# Patient Record
Sex: FEMALE
[De-identification: names, ages, dates, MRNs, and addresses within clinical notes are randomized per-mention and may not be internally consistent; named-entity substitution may affect disease eponyms.]

---

## 2022-09-02 ENCOUNTER — APPOINTMENT (OUTPATIENT)
Dept: NEUROSURGERY | Facility: CLINIC | Age: 32
End: 2022-09-02

## 2022-09-02 VITALS — HEIGHT: 61 IN | BODY MASS INDEX: 30.21 KG/M2 | WEIGHT: 160 LBS

## 2022-09-02 DIAGNOSIS — Z83.438 FAMILY HISTORY OF OTHER DISORDER OF LIPOPROTEIN METABOLISM AND OTHER LIPIDEMIA: ICD-10-CM

## 2022-09-02 DIAGNOSIS — Z82.49 FAMILY HISTORY OF ISCHEMIC HEART DISEASE AND OTHER DISEASES OF THE CIRCULATORY SYSTEM: ICD-10-CM

## 2022-09-02 DIAGNOSIS — Z87.898 PERSONAL HISTORY OF OTHER SPECIFIED CONDITIONS: ICD-10-CM

## 2022-09-02 DIAGNOSIS — Z83.3 FAMILY HISTORY OF DIABETES MELLITUS: ICD-10-CM

## 2022-09-02 DIAGNOSIS — Z78.9 OTHER SPECIFIED HEALTH STATUS: ICD-10-CM

## 2022-09-02 PROBLEM — Z00.00 ENCOUNTER FOR PREVENTIVE HEALTH EXAMINATION: Status: ACTIVE | Noted: 2022-09-02

## 2022-09-02 PROCEDURE — 99441: CPT

## 2022-09-02 RX ORDER — ACETAMINOPHEN 325 MG/1
TABLET, FILM COATED ORAL
Refills: 0 | Status: ACTIVE | COMMUNITY

## 2022-09-02 NOTE — HISTORY OF PRESENT ILLNESS
[Home] : at home, [unfilled] , at the time of the visit. [Medical Office: ([facility])___] : at the medical office located in  [Verbal consent obtained from patient] : the patient, [unfilled] [FreeTextEntry1] : "AVM" [de-identified] : Pt is a pleasant 32 year old right handed lady who reports that on 6/28/22 she went to [facility] due to dizziness, nausea, "black flashes" and tingling/numbness in left upper extremity extending to left thumb.  Pt states that episode lasted about 30 minutes.  Neuro imaging revealed an incidental finding of a cerebral AVM.\par \par States that she was transferred to [facility] for surgical intervention however after further diagnostic imaging she states that surgery was deferred because the surgeon felt that the AVM was too close to ivitalt brain structures.  Recommendation made for GK radiosurgery however patient expressed that she would prefer to have surgery due to her young age.

## 2022-09-02 NOTE — ASSESSMENT
[FreeTextEntry1] : IMPRESSION:\par 1. Recently diagnosed cerebral AVM.\par \par PLAN:\par 1. Functional brain MRI for speech and motor function.

## 2022-09-02 NOTE — PHYSICAL EXAM
[Person] : oriented to person [Place] : oriented to place [Time] : oriented to time [Motor Handedness Right-Handed] : the patient is right hand dominant

## 2022-09-30 ENCOUNTER — APPOINTMENT (OUTPATIENT)
Dept: NEUROSURGERY | Facility: CLINIC | Age: 32
End: 2022-09-30

## 2022-09-30 DIAGNOSIS — Z78.9 OTHER SPECIFIED HEALTH STATUS: ICD-10-CM

## 2022-09-30 PROCEDURE — 99443: CPT

## 2022-10-03 PROBLEM — Z78.9 NON-SMOKER: Status: ACTIVE | Noted: 2022-09-02

## 2022-10-03 NOTE — PHYSICAL EXAM
[General Appearance - Alert] : alert [General Appearance - Well Nourished] : well nourished [General Appearance - Well-Appearing] : healthy appearing [Oriented To Time, Place, And Person] : oriented to person, place, and time [Affect] : the affect was normal [Memory Recent] : recent memory was not impaired [Person] : oriented to person [Place] : oriented to place [Time] : oriented to time

## 2022-10-05 RX ORDER — ONDANSETRON 4 MG/1
4 TABLET ORAL
Qty: 1 | Refills: 0 | Status: ACTIVE | COMMUNITY
Start: 2022-10-05 | End: 1900-01-01

## 2022-10-05 NOTE — HISTORY OF PRESENT ILLNESS
[Home] : at home, [unfilled] , at the time of the visit. [Medical Office: ([facility])___] : at the medical office located in  [Verbal consent obtained from patient] : the patient, [unfilled] [FreeTextEntry1] : Pt is a pleasant 32 year old right handed lady who reports that on 6/28/22 she went to [facility] due to dizziness, nausea, "black flashes" and tingling/numbness in left upper extremity extending to left thumb. Pt states that episode lasted about 30 minutes. Neuro imaging revealed an incidental finding of a cerebral AVM.\par \par States that she was transferred to [facility] for surgical intervention however after further diagnostic imaging she states that surgery was deferred because the surgeon felt that the AVM was too close to ivitalt brain structures. Recommendation made for GK radiosurgery however patient expressed that she would prefer to have surgery due to her young age. \par \par Pt is participating in a telephone conversation along with her mother to discuss surgical intervention for AVM resection.  Pt is pending MRI brain - functional and an EEG.\par \par Dr. Grubbs discussed in detail with patient and mom that he would like to have the AVM embolized by Dr. Sands prior to surgery.  Risks related to both procedures explained to pt and her mother.  Pt/mom would like to discuss more about the embolization procedure with Dr. Sands.\par

## 2022-10-05 NOTE — ASSESSMENT
[FreeTextEntry1] : IMPRESSION:\par 1. 2.4 x 1.7 cm posterior left temporal  AVM that does not have any deep venous drainage however < 3 cm may be in an eloquent area.  Will have functional MRI for speech and movement prior to surgery.\par 2. Pending EEG prior to surgery.\par \par \par \par PLAN:\par 1. Pt will follow up with Dr. Sands to discuss embolization procedure.\par 2. Dr. Grubbs explained in detail that there is a potential risk of patient having a right upper visual field deficit after surgery.\par 3. Surgery to be planned for November 2022.\par \par .\par

## 2022-10-11 ENCOUNTER — APPOINTMENT (OUTPATIENT)
Dept: NEUROSURGERY | Facility: CLINIC | Age: 32
End: 2022-10-11

## 2022-10-18 NOTE — ASSESSMENT
[FreeTextEntry1] : Impression:\par Incidental Posterior Left Temporal AVM\par \par I discussed with the patient the natural history of AVM, the various treatment options (endovascular, surgical, radiation, combination) and the pros and cons of treatment versus observation.  I advised that I recommend treating the AVM with embolization followed by surgery.\par \par The risks, benefits and alternatives of endovascular treatment were discussed with the patient in detail. In my personal experience, the risks are very rare, but the possibility is not zero. Risks include stroke, brain hemorrhage, any type of disability (facial or extremity weakness, facial or extremity numbness, speech difficulties, blindness) and death. There are also possible complications related to the incisions such as infection, pain, swelling and bleeding. \par \par Plan:\par Pre-Op AVM Embolization for Dr. Grubbs

## 2022-10-18 NOTE — HISTORY OF PRESENT ILLNESS
[de-identified] : Ms. Zheng is a pleasant 31yo female who is referred to me today by Dr. Grubbs for AVM.  In 6/2022 she went to [facility] due to dizziness, nausea, "black flashes" and tingling/numbness in left upper extremity extending to left thumb.  The symptoms lasted for about 30 minutes and imaging revealed an incidental cerebral AVM.\par \par She states that she was transferred to [facility] for surgical intervention however after further diagnostic imaging she states that surgery was deferred because the surgeon felt that the AVM was too close to vital brain structures. Recommendation made for GK radiosurgery however patient expressed that she would prefer to have surgery due to her young age.

## 2022-10-18 NOTE — REASON FOR VISIT
[Home] : at home, [unfilled] , at the time of the visit. [Medical Office: ([facility])___] : at the medical office located in  [Patient] : the patient [Self] : self [New Patient Visit] : a new patient visit [FreeTextEntry1] : AVM

## 2022-11-08 ENCOUNTER — APPOINTMENT (OUTPATIENT)
Dept: NEUROSURGERY | Facility: HOSPITAL | Age: 32
End: 2022-11-08

## 2022-11-11 ENCOUNTER — APPOINTMENT (OUTPATIENT)
Dept: NEUROSURGERY | Facility: CLINIC | Age: 32
End: 2022-11-11

## 2022-11-11 DIAGNOSIS — Q28.2 ARTERIOVENOUS MALFORMATION OF CEREBRAL VESSELS: ICD-10-CM

## 2022-11-11 PROCEDURE — 99443: CPT

## 2022-11-17 NOTE — ASSESSMENT
[FreeTextEntry1] : IMPRESSION:\par 1. 1. Recently diagnosed cerebral AVM.  S/P functional MRI 11/2/22 and would like to discuss results and surgical plan.\par \par \par \par PLAN;\par 1. Pt will send cerebral angiogram imaging that ws done at [facility] for us to upload to our sever.\par 2. Reviewed functional imaging in detail with patient and all questions answered to her satisfaction.\par 3. Pt will think about her decision for surgery and let us know.